# Patient Record
Sex: FEMALE | Race: WHITE | URBAN - METROPOLITAN AREA
[De-identification: names, ages, dates, MRNs, and addresses within clinical notes are randomized per-mention and may not be internally consistent; named-entity substitution may affect disease eponyms.]

---

## 2024-10-05 ENCOUNTER — OFFICE VISIT (OUTPATIENT)
Age: 39
End: 2024-10-05

## 2024-10-05 VITALS
WEIGHT: 233 LBS | DIASTOLIC BLOOD PRESSURE: 70 MMHG | BODY MASS INDEX: 35.31 KG/M2 | SYSTOLIC BLOOD PRESSURE: 128 MMHG | OXYGEN SATURATION: 96 % | HEART RATE: 81 BPM | HEIGHT: 68 IN | TEMPERATURE: 98.2 F

## 2024-10-05 DIAGNOSIS — J22 LOWER RESPIRATORY TRACT INFECTION: ICD-10-CM

## 2024-10-05 DIAGNOSIS — J40 BRONCHITIS: Primary | ICD-10-CM

## 2024-10-05 RX ORDER — AZITHROMYCIN 250 MG/1
TABLET, FILM COATED ORAL
Qty: 6 TABLET | Refills: 0 | Status: SHIPPED | OUTPATIENT
Start: 2024-10-05 | End: 2024-10-15

## 2024-10-05 RX ORDER — METHYLPREDNISOLONE 4 MG
TABLET, DOSE PACK ORAL
Qty: 1 KIT | Refills: 0 | Status: SHIPPED | OUTPATIENT
Start: 2024-10-05 | End: 2024-10-11

## 2024-10-05 RX ORDER — BENZONATATE 200 MG/1
200 CAPSULE ORAL 3 TIMES DAILY PRN
Qty: 20 CAPSULE | Refills: 0 | Status: SHIPPED | OUTPATIENT
Start: 2024-10-05 | End: 2024-10-12

## 2024-10-05 ASSESSMENT — ENCOUNTER SYMPTOMS
CHEST TIGHTNESS: 1
ABDOMINAL PAIN: 0
RHINORRHEA: 1
COUGH: 1
SORE THROAT: 0
BACK PAIN: 0
NAUSEA: 0
DIARRHEA: 0
VOMITING: 0
WHEEZING: 1
SHORTNESS OF BREATH: 1

## 2024-10-05 NOTE — PROGRESS NOTES
Daisy Gonzalez (:  1985) is a 39 y.o. female,New patient, here for evaluation of the following chief complaint(s):  Cough (Patient c/o cough and chest congestion x4 days, taking  Mucinex for treatment) and Chest Congestion      Assessment & Plan :  Visit Diagnoses and Associated Orders       Bronchitis    -  Primary    methylPREDNISolone (MEDROL DOSEPACK) 4 MG tablet [4991]      benzonatate (TESSALON) 200 MG capsule [65758]           Lower respiratory tract infection        azithromycin (ZITHROMAX) 250 MG tablet [77886]                 Differential diagnoses: bronchitis, pneumonia, sinusitis, strep pharyngitis, viral pharyngitis, viral URI, allergic rhinitis, covid, influenza, otitis media, tonsillar abscess     Plan: Appears to have a lower respiratory tract infection/bronchitis. She will be treated today for the infection with azithromycin. She was also prescribed a steroid pack and cough medicine for symptom relief. Encouraged to rest and increase fluid intake and continue to take tylenol and/or ibuprofen for pain/fever relief. Follow-up with PCP as needed. Return precautions discussed. Follow up in 3 days if symptoms persist or if symptoms worsen.       Subjective :  HPI  Daisy Gonzalez is  39 y.o. female who presents with complaints of an ongoing cough and chest congestion. She reports that she has had a \"wet\" cough and chest congestion for the last 4 days. She states that her symptoms seem to be worsening. She reports history of bronchitis and \"walking pneumonia.\" She states that she has been mildly short of breath as well with coughing spells. She states that she has been coughing up green sputum. She denies any known sick contacts. She reports that she had a negative covid test at home on Wednesday. She has been taking mucinex with minimal improvement. She has also noted a runny nose but denies nasal congestion. She denies any history of asthma.          Vitals:    10/05/24 1327 10/05/24 1338

## 2024-10-05 NOTE — PATIENT INSTRUCTIONS
New Prescriptions    AZITHROMYCIN (ZITHROMAX) 250 MG TABLET    500mg on day 1 followed by 250mg on days 2 - 5    BENZONATATE (TESSALON) 200 MG CAPSULE    Take 1 capsule by mouth 3 times daily as needed for Cough    METHYLPREDNISOLONE (MEDROL DOSEPACK) 4 MG TABLET    Take by mouth per package directions.      Take the antibiotic as prescribed.  Take tylenol and/or ibuprofen for pain/fever relief.  Encourage rest and increase fluid intake.  Take the cough medicine as needed for symptom relief.  Take the steroid pack as directed.  Follow-up with your PCP as needed.  Return for severe/worsening symptoms.

## 2025-05-04 ENCOUNTER — OFFICE VISIT (OUTPATIENT)
Age: 40
End: 2025-05-04

## 2025-05-04 VITALS
HEART RATE: 99 BPM | DIASTOLIC BLOOD PRESSURE: 80 MMHG | SYSTOLIC BLOOD PRESSURE: 126 MMHG | BODY MASS INDEX: 35.76 KG/M2 | WEIGHT: 235.2 LBS | OXYGEN SATURATION: 96 % | TEMPERATURE: 98.1 F

## 2025-05-04 DIAGNOSIS — R31.9 HEMATURIA, UNSPECIFIED TYPE: Primary | ICD-10-CM

## 2025-05-04 DIAGNOSIS — M54.50 LOW BACK PAIN WITHOUT SCIATICA, UNSPECIFIED BACK PAIN LATERALITY, UNSPECIFIED CHRONICITY: ICD-10-CM

## 2025-05-04 LAB
BILIRUBIN, POC: NEGATIVE
BLOOD URINE, POC: ABNORMAL
CLARITY, POC: CLEAR
COLOR, POC: YELLOW
GLUCOSE URINE, POC: NEGATIVE MG/DL
KETONES, POC: NEGATIVE MG/DL
LEUKOCYTE EST, POC: NEGATIVE
NITRITE, POC: NEGATIVE
PH, POC: 6
PROTEIN, POC: NEGATIVE MG/DL
SPECIFIC GRAVITY, POC: 1.01
UROBILINOGEN, POC: 0.2 MG/DL

## 2025-05-04 RX ORDER — OMEPRAZOLE 20 MG/1
20 CAPSULE, DELAYED RELEASE ORAL DAILY
COMMUNITY

## 2025-05-04 RX ORDER — LISINOPRIL AND HYDROCHLOROTHIAZIDE 10; 12.5 MG/1; MG/1
1 TABLET ORAL DAILY
COMMUNITY
Start: 2024-06-19